# Patient Record
Sex: MALE | Race: WHITE | Employment: FULL TIME | URBAN - METROPOLITAN AREA
[De-identification: names, ages, dates, MRNs, and addresses within clinical notes are randomized per-mention and may not be internally consistent; named-entity substitution may affect disease eponyms.]

---

## 2020-06-09 ENCOUNTER — LAB ENCOUNTER (OUTPATIENT)
Dept: LAB | Facility: HOSPITAL | Age: 25
End: 2020-06-09
Attending: STUDENT IN AN ORGANIZED HEALTH CARE EDUCATION/TRAINING PROGRAM
Payer: COMMERCIAL

## 2020-06-09 ENCOUNTER — HOSPITAL ENCOUNTER (OUTPATIENT)
Dept: CV DIAGNOSTICS | Facility: HOSPITAL | Age: 25
Discharge: HOME | End: 2020-06-09
Attending: STUDENT IN AN ORGANIZED HEALTH CARE EDUCATION/TRAINING PROGRAM
Payer: COMMERCIAL

## 2020-06-09 DIAGNOSIS — Z82.49 FAMILY HISTORY OF CARDIOMYOPATHY: Primary | ICD-10-CM

## 2020-06-09 DIAGNOSIS — Z82.49 FAMILY HISTORY OF ISCHEMIC HEART DISEASE: ICD-10-CM

## 2020-06-09 PROCEDURE — 93010 ELECTROCARDIOGRAM REPORT: CPT | Performed by: STUDENT IN AN ORGANIZED HEALTH CARE EDUCATION/TRAINING PROGRAM

## 2020-06-09 PROCEDURE — 93306 TTE W/DOPPLER COMPLETE: CPT | Performed by: STUDENT IN AN ORGANIZED HEALTH CARE EDUCATION/TRAINING PROGRAM

## 2020-06-09 PROCEDURE — 93005 ELECTROCARDIOGRAM TRACING: CPT

## 2024-02-20 ENCOUNTER — LAB ENCOUNTER (OUTPATIENT)
Dept: LAB | Facility: HOSPITAL | Age: 29
End: 2024-02-20
Attending: FAMILY MEDICINE
Payer: COMMERCIAL

## 2024-02-20 ENCOUNTER — OFFICE VISIT (OUTPATIENT)
Dept: INTERNAL MEDICINE CLINIC | Facility: CLINIC | Age: 29
End: 2024-02-20
Payer: COMMERCIAL

## 2024-02-20 VITALS
OXYGEN SATURATION: 97 % | WEIGHT: 217 LBS | SYSTOLIC BLOOD PRESSURE: 120 MMHG | HEIGHT: 72 IN | DIASTOLIC BLOOD PRESSURE: 80 MMHG | BODY MASS INDEX: 29.39 KG/M2 | HEART RATE: 82 BPM

## 2024-02-20 DIAGNOSIS — Z00.00 HEALTH MAINTENANCE EXAMINATION: Primary | ICD-10-CM

## 2024-02-20 DIAGNOSIS — L21.9 SEBORRHEIC DERMATITIS: ICD-10-CM

## 2024-02-20 DIAGNOSIS — J30.2 SEASONAL ALLERGIES: ICD-10-CM

## 2024-02-20 DIAGNOSIS — E66.3 OVERWEIGHT (BMI 25.0-29.9): ICD-10-CM

## 2024-02-20 DIAGNOSIS — Z00.00 HEALTH MAINTENANCE EXAMINATION: ICD-10-CM

## 2024-02-20 PROBLEM — I10 ESSENTIAL HYPERTENSION: Status: ACTIVE | Noted: 2020-05-26

## 2024-02-20 PROBLEM — I10 ESSENTIAL HYPERTENSION: Status: RESOLVED | Noted: 2020-05-26 | Resolved: 2024-02-20

## 2024-02-20 PROBLEM — Z15.89 GENE MUTATION: Status: ACTIVE | Noted: 2024-02-20

## 2024-02-20 LAB
ALBUMIN SERPL-MCNC: 4.7 G/DL (ref 3.2–4.8)
ALBUMIN/GLOB SERPL: 1.6 {RATIO} (ref 1–2)
ALP LIVER SERPL-CCNC: 83 U/L
ALT SERPL-CCNC: 24 U/L
ANION GAP SERPL CALC-SCNC: 8 MMOL/L (ref 0–18)
AST SERPL-CCNC: 21 U/L (ref ?–34)
BASOPHILS # BLD AUTO: 0.05 X10(3) UL (ref 0–0.2)
BASOPHILS NFR BLD AUTO: 0.7 %
BILIRUB SERPL-MCNC: 0.6 MG/DL (ref 0.3–1.2)
BUN BLD-MCNC: 20 MG/DL (ref 9–23)
BUN/CREAT SERPL: 18.2 (ref 10–20)
CALCIUM BLD-MCNC: 9.7 MG/DL (ref 8.7–10.4)
CHLORIDE SERPL-SCNC: 105 MMOL/L (ref 98–112)
CHOLEST SERPL-MCNC: 220 MG/DL (ref ?–200)
CO2 SERPL-SCNC: 27 MMOL/L (ref 21–32)
CREAT BLD-MCNC: 1.1 MG/DL
DEPRECATED RDW RBC AUTO: 36.6 FL (ref 35.1–46.3)
EGFRCR SERPLBLD CKD-EPI 2021: 92 ML/MIN/1.73M2 (ref 60–?)
EOSINOPHIL # BLD AUTO: 0.26 X10(3) UL (ref 0–0.7)
EOSINOPHIL NFR BLD AUTO: 3.6 %
ERYTHROCYTE [DISTWIDTH] IN BLOOD BY AUTOMATED COUNT: 11.4 % (ref 11–15)
EST. AVERAGE GLUCOSE BLD GHB EST-MCNC: 114 MG/DL (ref 68–126)
FASTING PATIENT LIPID ANSWER: NO
FASTING STATUS PATIENT QL REPORTED: NO
GLOBULIN PLAS-MCNC: 3 G/DL (ref 2.8–4.4)
GLUCOSE BLD-MCNC: 91 MG/DL (ref 70–99)
HBA1C MFR BLD: 5.6 % (ref ?–5.7)
HCT VFR BLD AUTO: 43.5 %
HCV AB SERPL QL IA: NONREACTIVE
HDLC SERPL-MCNC: 55 MG/DL (ref 40–59)
HGB BLD-MCNC: 15.7 G/DL
IMM GRANULOCYTES # BLD AUTO: 0.02 X10(3) UL (ref 0–1)
IMM GRANULOCYTES NFR BLD: 0.3 %
LDLC SERPL CALC-MCNC: 143 MG/DL (ref ?–100)
LYMPHOCYTES # BLD AUTO: 2.33 X10(3) UL (ref 1–4)
LYMPHOCYTES NFR BLD AUTO: 32.3 %
MCH RBC QN AUTO: 31.5 PG (ref 26–34)
MCHC RBC AUTO-ENTMCNC: 36.1 G/DL (ref 31–37)
MCV RBC AUTO: 87.3 FL
MONOCYTES # BLD AUTO: 0.3 X10(3) UL (ref 0.1–1)
MONOCYTES NFR BLD AUTO: 4.2 %
NEUTROPHILS # BLD AUTO: 4.26 X10 (3) UL (ref 1.5–7.7)
NEUTROPHILS # BLD AUTO: 4.26 X10(3) UL (ref 1.5–7.7)
NEUTROPHILS NFR BLD AUTO: 58.9 %
NONHDLC SERPL-MCNC: 165 MG/DL (ref ?–130)
OSMOLALITY SERPL CALC.SUM OF ELEC: 292 MOSM/KG (ref 275–295)
PLATELET # BLD AUTO: 292 10(3)UL (ref 150–450)
POTASSIUM SERPL-SCNC: 4 MMOL/L (ref 3.5–5.1)
PROT SERPL-MCNC: 7.7 G/DL (ref 5.7–8.2)
RBC # BLD AUTO: 4.98 X10(6)UL
SODIUM SERPL-SCNC: 140 MMOL/L (ref 136–145)
TRIGL SERPL-MCNC: 122 MG/DL (ref 30–149)
TSI SER-ACNC: 2.42 MIU/ML (ref 0.55–4.78)
VLDLC SERPL CALC-MCNC: 23 MG/DL (ref 0–30)
WBC # BLD AUTO: 7.2 X10(3) UL (ref 4–11)

## 2024-02-20 PROCEDURE — 99385 PREV VISIT NEW AGE 18-39: CPT | Performed by: FAMILY MEDICINE

## 2024-02-20 PROCEDURE — 3074F SYST BP LT 130 MM HG: CPT | Performed by: FAMILY MEDICINE

## 2024-02-20 PROCEDURE — 86803 HEPATITIS C AB TEST: CPT | Performed by: FAMILY MEDICINE

## 2024-02-20 PROCEDURE — 83036 HEMOGLOBIN GLYCOSYLATED A1C: CPT | Performed by: FAMILY MEDICINE

## 2024-02-20 PROCEDURE — 3079F DIAST BP 80-89 MM HG: CPT | Performed by: FAMILY MEDICINE

## 2024-02-20 PROCEDURE — 3008F BODY MASS INDEX DOCD: CPT | Performed by: FAMILY MEDICINE

## 2024-02-20 PROCEDURE — 80050 GENERAL HEALTH PANEL: CPT | Performed by: FAMILY MEDICINE

## 2024-02-20 PROCEDURE — 87389 HIV-1 AG W/HIV-1&-2 AB AG IA: CPT | Performed by: FAMILY MEDICINE

## 2024-02-20 PROCEDURE — 80061 LIPID PANEL: CPT | Performed by: FAMILY MEDICINE

## 2024-02-20 RX ORDER — KETOCONAZOLE 20 MG/G
CREAM TOPICAL
COMMUNITY
Start: 2022-07-19

## 2024-02-20 NOTE — PATIENT INSTRUCTIONS
PATIENT INSTRUCTIONS    Thank you for seeing me today, it was a pleasure taking care of you.  Please check out at the  and schedule a follow up appointment.  Return in about 1 year (around 2/20/2025) for physical .  Please remember that the radha period for all appointments is 5 minutes. This is to help maximize the amount of time that we can spend together at our visits.    Please get your labs drawn - you may need to schedule a lab appointment if this was not completed at your recent doctor's visit.  The following imaging studies were ordered: None  Please also follow up with the following specialists: Dermatology   Please fill out the advance directive information (power of  documents) and bring a copy to our clinic.  Focus on a healthy diet and exercise  Will monitor blood pressures  Consider COVID vaccine  Can continue ketoconazole cream as needed        Best,   Dr. Douglas

## 2024-02-20 NOTE — ASSESSMENT & PLAN NOTE
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  Will monitor for cardiac symptoms

## 2024-02-20 NOTE — PROGRESS NOTES
FAMILY MEDICINE CLINIC NOTE    HPI  Ortega Ford is a 29 year old male presenting for physical    #Health Maintenance  -Diet: Good. Well rounded diet.   -Exercise: Started going to March 2023. Small group weight training.   -Lung cancer screen: Not indicated  -Colon cancer screen: Not indicated  -Prostate cancer screen: Not indicated  -Aortic aneurysm screen: Not indicated  -Statin:  Will check lipid panel  -ASA: Not indicated  -HIV screen: Indicated  -Hep C screen: Indicated  -Gonorrhea/chlamydia:  Not indicated  -Syphillis: Not indicated  -TB: Not indicated  -Tobacco/alcohol: Per below  -Depression: PHQ-2 score of 0 (score >/= 3 do PHQ-9)  -Advanced Directive: Indicated    #Immunizations  -Tdap:  Reports received  - 9/2023  -Flu shot: Indicated  -PCV13: Not indicated   -PCV20: Not indicated   -PPSV23: Not indicated   -HPV: Not indicated  -RZV (preferred) or VZL: Not indicated   -RSV: Not indicated   -COVID: Indicated    #Seasonal allergies  -using over the counter allergy medication as needed    #seborrheic dermatitis  -ketconazole cream  -seeing dermatology Muriel LOPEZ    #Gene mutation  -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   -seen by cardiology at [facility] in the past  -no CP, no SOB, no palpitations    #Patient Care Team  No care team member to display    ROS  GENERAL: No fever/chills, no recent weight loss   HEENT: No visual changes, no changes in hearing, no sore throats  NECK: No pain, no swelling  RESP: No cough, no SOB  CV: No chest pain, no palpitations  GI: No abd pain, no N/V/D  MSK: No edema, no pain  SKIN: No new rashes  NEURO: No numbness, no tingling, no HA    HEALTH MAINTENANCE CHECKLIST  Health Maintenance Topics with due status: Overdue       Topic Date Due    Annual Physical Never done    DTaP,Tdap,and Td Vaccines Never done    COVID-19 Vaccine Never done    Influenza Vaccine Never done    Annual Depression Screening Never done       ALLERGIES  No  Known Allergies    MEDICATIONS  Current Outpatient Medications   Medication Sig Dispense Refill    ketoconazole 2 % External Cream          ACTIVE PROBLEM  Patient Active Problem List   Diagnosis    Seborrheic dermatitis    Seasonal allergies    Health maintenance examination    Gene mutation    Overweight (BMI 25.0-29.9)       PAST MEDICAL HISTORY  Past Medical History:   Diagnosis Date    Gene mutation     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    Seborrheic dermatitis 02/20/2024       PAST SOCIAL HISTORY  Social History     Socioeconomic History    Marital status: Single     Spouse name: Not on file    Number of children: Not on file    Years of education: Not on file    Highest education level: Not on file   Occupational History    Not on file   Tobacco Use    Smoking status: Never    Smokeless tobacco: Never   Vaping Use    Vaping Use: Never used   Substance and Sexual Activity    Alcohol use: Yes     Comment: Occasional - 2-3 times a week    Drug use: Never    Sexual activity: Yes     Partners: Female   Other Topics Concern    Not on file   Social History Narrative    Relationships:  - [place]    Children: None    Pets: 2 Dogs    School: N/A    Work:  - high voltage power lines.     Origin: From [facility].     Interests: Plays hockey, renovating house, started golf. Watching sports - [employer]     Spiritual: Ep[employer] background, not practicing     Social Determinants of Health     Financial Resource Strain: Not on file   Food Insecurity: Not on file   Transportation Needs: Not on file   Physical Activity: Not on file   Stress: Not on file   Social Connections: Not on file   Housing Stability: Not on file       PAST SURGICAL HISTORY  History reviewed. No pertinent surgical history.    PAST FAMILY HISTORY  Family History   Problem Relation Age of Onset    No Known Problems Mother     Hyperlipidemia Father     Hypertension Father     Heart Disorder Father          Heart failure    Arrhythmia Father         A-fib, pacemaker    Arthritis Father     Diabetes Father     Bipolar Disorder Father     Cancer Sister         Non-hodgkins lymphoma    Heart Disease Sister         Dilated cardiomyopathy - doxorubicin toxicity and TTN mutation    Other (Brain aneurysm) Maternal Grandmother     Cancer Maternal Grandfather         Jaw    No Known Problems Paternal Grandmother     No Known Problems Paternal Grandfather     Other (Lupus) Paternal Uncle     No Known Problems Paternal Uncle         Early sudden death    Colon Cancer Neg     Prostate Cancer Neg        PHYSICAL EXAM  Vitals:    02/20/24 1509   BP: 120/80   Pulse: 82   SpO2: 97%   Weight: 217 lb (98.4 kg)   Height: 6' (1.829 m)      Body mass index is 29.43 kg/m².    GENERAL: NAD  HEENT: Moist mucous membranes, no tonsillar swelling or erythema, PERRLA bilat, TM translucent and non-bulging  NECK: Supple, non-tender  RESP: CTAB, no wheezing, no rales, no ronchi  CV: RRR, no murmurs  GI: Soft, non-distended, non-tender, no guarding, no rebound, no masses  MSK: No edema  SKIN: Warm and dry, no rashes  NEURO: Answering questions appropriately    LABS  No results found for: \"WBC\", \"HGB\", \"HCT\", \"PLT\", \"NEPERCENT\", \"LYPERCENT\", \"MOPERCENT\", \"EOPERCENT\", \"BAPERCENT\", \"NE\", \"LYMABS\", \"MOABSO\", \"EOABSO\", \"BAABSO\", \"REITCPERCENT\"    No results found for: \"NA\", \"K\", \"CL\", \"CO2\", \"ANIONGAP\", \"BUN\", \"CREATSERUM\", \"BUNCREA\", \"GLU\", \"CA\", \"OSMOCALC\", \"GFRNAA\", \"GFRAA\", \"ALT\", \"AST\", \"ALKPHO\", \"BILT\", \"TP\", \"ALB\", \"GLOBULIN\", \"ELECTAG\", \"FASTING\"      No results found for: \"CHOLEST\", \"TRIG\", \"HDL\", \"LDL\", \"VLDL\", \"TCHDLRATIO\", \"NONHDLC\", \"CHOLHDLRATIO\", \"CALCNONHDL\"     DIAGNOSTICS    ASSESSMENT/PLAN  Problem List Items Addressed This Visit          Endocrine and Metabolic    Overweight (BMI 25.0-29.9)     Check labs.  Focus on a healthy diet and exercise.         Relevant Orders    Comp Metabolic Panel (14)    Hemoglobin A1C    Lipid Panel    TSH  W Reflex To Free T4       EarNoseThroat    Seasonal allergies     Uses over the counter allergy medication as needed            Skin    Seborrheic dermatitis     Follows with dermatology.  Uses ketoconazole cream as needed         Relevant Medications    ketoconazole 2 % External Cream       Health Encounters    Health maintenance examination - Primary     Exercise and diet advised.  CBC, CMP, lipid panel, Hba1c, TSH, HIV screen, hepatitis C screen  Tdap - reports completed 9/2023  Flu shot today.  Advanced directive information provided.  Advised COVID vaccine.  Blood pressures controlled at this time -not currently on medications.   Will monitor closely          Relevant Orders    CBC With Differential With Platelet    Comp Metabolic Panel (14)    HCV Antibody    Hemoglobin A1C    HIV Ag/Ab Combo    Lipid Panel    TSH W Reflex To Free T4    FLULAVAL INFLUENZA VACCINE QUAD PRESERVATIVE FREE 0.5 ML       Return in about 1 year (around 2/20/2025) for physical .    Rodolfo Douglas MD  Family Medicine

## 2024-02-21 PROBLEM — E78.00 PURE HYPERCHOLESTEROLEMIA: Status: ACTIVE | Noted: 2024-02-21

## 2024-02-21 PROCEDURE — 90686 IIV4 VACC NO PRSV 0.5 ML IM: CPT | Performed by: FAMILY MEDICINE

## 2024-02-21 PROCEDURE — 3079F DIAST BP 80-89 MM HG: CPT | Performed by: FAMILY MEDICINE

## 2024-02-21 PROCEDURE — 3008F BODY MASS INDEX DOCD: CPT | Performed by: FAMILY MEDICINE

## 2024-02-21 PROCEDURE — 90471 IMMUNIZATION ADMIN: CPT | Performed by: FAMILY MEDICINE

## 2024-02-21 PROCEDURE — 3074F SYST BP LT 130 MM HG: CPT | Performed by: FAMILY MEDICINE

## 2025-03-06 ENCOUNTER — OFFICE VISIT (OUTPATIENT)
Dept: INTERNAL MEDICINE CLINIC | Facility: CLINIC | Age: 30
End: 2025-03-06
Payer: COMMERCIAL

## 2025-03-06 VITALS
OXYGEN SATURATION: 99 % | TEMPERATURE: 98 F | SYSTOLIC BLOOD PRESSURE: 122 MMHG | BODY MASS INDEX: 29.66 KG/M2 | DIASTOLIC BLOOD PRESSURE: 80 MMHG | HEART RATE: 75 BPM | WEIGHT: 219 LBS | HEIGHT: 72 IN

## 2025-03-06 DIAGNOSIS — L21.9 SEBORRHEIC DERMATITIS: ICD-10-CM

## 2025-03-06 DIAGNOSIS — J30.2 SEASONAL ALLERGIES: ICD-10-CM

## 2025-03-06 DIAGNOSIS — S05.02XD ABRASION OF LEFT CORNEA, SUBSEQUENT ENCOUNTER: ICD-10-CM

## 2025-03-06 DIAGNOSIS — Z15.89 GENE MUTATION: ICD-10-CM

## 2025-03-06 DIAGNOSIS — E78.00 PURE HYPERCHOLESTEROLEMIA: ICD-10-CM

## 2025-03-06 DIAGNOSIS — E66.3 OVERWEIGHT (BMI 25.0-29.9): ICD-10-CM

## 2025-03-06 DIAGNOSIS — Z00.00 HEALTH MAINTENANCE EXAMINATION: Primary | ICD-10-CM

## 2025-03-06 PROBLEM — S05.02XA ABRASION OF LEFT CORNEA: Status: ACTIVE | Noted: 2025-03-06

## 2025-03-06 PROCEDURE — 3008F BODY MASS INDEX DOCD: CPT | Performed by: FAMILY MEDICINE

## 2025-03-06 PROCEDURE — 99395 PREV VISIT EST AGE 18-39: CPT | Performed by: FAMILY MEDICINE

## 2025-03-06 PROCEDURE — 3074F SYST BP LT 130 MM HG: CPT | Performed by: FAMILY MEDICINE

## 2025-03-06 PROCEDURE — 3079F DIAST BP 80-89 MM HG: CPT | Performed by: FAMILY MEDICINE

## 2025-03-06 NOTE — ASSESSMENT & PLAN NOTE
Follows with dermatology.  Uses ketoconazole cream as needed  Overall well controlled at this time.

## 2025-03-06 NOTE — PROGRESS NOTES
FAMILY MEDICINE CLINIC NOTE    HPI  Ortega Ford is a 30 year old male presenting for physical    #Health Maintenance  -Diet: Good. Well rounded diet.   -Exercise: Going to the gym 5 times a week. Small group weight training.   -Lung cancer screen: Not indicated  -Colon cancer screen: Not indicated  -Prostate cancer screen: Not indicated  -Aortic aneurysm screen: Not indicated  -Statin:  2/2024 lipid panel  -ASA: Not indicated  -HIV screen: 2/2024 negative  -Hep C screen: 2/2024 negative   -Gonorrhea/chlamydia:  Not indicated  -Syphillis: Not indicated  -TB: Not indicated  -Tobacco/alcohol: Per below  -Depression: PHQ-2 score of 0 (score >/= 3 do PHQ-9)  -Advanced Directive: Indicated     #Immunizations  -Tdap:  Reports received  - 9/2023  -Flu shot: Indicated  -PCV13: Not indicated   -PCV20: Not indicated   -PPSV23: Not indicated   -HPV: Not indicated  -RZV (preferred) or VZL: Not indicated   -RSV: Not indicated   -COVID: Indicated     #Seasonal allergies  -using over the counter allergy medication as needed     #Seborrheic dermatitis  -ketconazole cream - not needing currently  -seeing new dermatology Shirley Zazueta PA-C at [facility]     #Gene mutation  -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   -sister with dilated cardiomyopathy - doxorubcin toxicity and TTN mutation  -seen by cardiology Dr Mahogany Eller at [facility] in 2020  -no CP, no SOB, no palpitations    #Corneal abrasion  -dog scratched eye in July 2024  -saw ophthalmologist Dr Arvizu - has plans to follow up in March     #HLD  -lifestyle modifications discussed    #Patient Care Team  Patient Care Team:  Rodolfo Douglas MD as PCP - General (Family Medicine)  Rhonda Sanz (OPHTHALMOLOGY)  Mahogany Eller (Internal Medicine)    TAMARA  GENERAL: No fever/chills, no recent weight loss   HEENT: No visual changes, no changes in hearing, no sore throats  NECK: No pain, no swelling  RESP: No cough, no  The ABCs of the Annual Wellness Visit  [place] to Medicare Visit    Subjective     Snow Anderson is a 72 y.o. female who presents for a  Welcome to Medicare Visit.    The following portions of the patient's history were reviewed and   updated as appropriate: allergies, current medications, past family history, past medical history, past social history, past surgical history, and problem list.     Compared to one year ago, the patient feels her physical   health is the same.    Compared to one year ago, the patient feels her mental   health is the same.    Recent Hospitalizations:  She was not admitted to the hospital during the last year.       Current Medical Providers:  Patient Care Team:  Lilo Hernandez MD as PCP - General (Family Medicine)    Outpatient Medications Prior to Visit   Medication Sig Dispense Refill    albuterol sulfate  (90 Base) MCG/ACT inhaler Every 6 (Six) Hours As Needed.      Azelastine HCl 137 MCG/SPRAY solution As Needed.      Biotin 1 MG capsule Daily.      Cholecalciferol (VITAMIN D) 1000 UNITS tablet Take 2 tablets by mouth.      EPINEPHrine (EPIPEN JR) 0.15 MG/0.3ML solution auto-injector injection As Needed.      estradiol (ESTRACE) 0.1 MG/GM vaginal cream Insert 1 applicator into the vagina.      fexofenadine (ALLEGRA) 180 MG tablet Take 1 tablet by mouth Daily.      hydrocortisone 2.5 % cream As Needed.      ipratropium (ATROVENT) 0.06 % nasal spray USE 2 SPRAY(S) IN EACH NOSTRIL 3 TO 4 TIMES DAILY      ipratropium-albuterol (DUO-NEB) 0.5-2.5 mg/3 ml nebulizer As Needed.      Multiple Vitamin (MULTI VITAMIN DAILY PO) Take  by mouth.      omeprazole (priLOSEC) 20 MG capsule Take 1 capsule by mouth Daily. 90 capsule 3    potassium chloride (K-DUR,KLOR-CON) 20 MEQ CR tablet TAKE 1  BY MOUTH ONCE DAILY 90 tablet 0    rosuvastatin (Crestor) 5 MG tablet Take 1 tablet by mouth Every Night. 90 tablet 2    Trelegy Ellipta 100-62.5-25 MCG/ACT inhaler       Triamcinolone Acetonide  "(NASACORT) 55 MCG/ACT nasal inhaler 2 sprays into the nostril(s) as directed by provider Daily.      aspirin 81 MG EC tablet Take 1 tablet by mouth Daily. Indications: Buildup of Plaques in Large Arteries Leading to the Brain      chlorthalidone (HYGROTON) 25 MG tablet Take 1 tablet by mouth once daily (Patient not taking: Reported on 1/30/2024) 90 tablet 0     No facility-administered medications prior to visit.       No opioid medication identified on active medication list. I have reviewed chart for other potential  high risk medication/s and harmful drug interactions in the elderly.        Aspirin is not on active medication list.  Aspirin use is indicated based on review of current medical condition/s. Pros and cons of this therapy have been discussed with this patient. Benefits of this medication outweigh potential harm.  Patient has been instructed to start taking this medication..    Patient Active Problem List   Diagnosis    Atopic rhinitis    Carotid atherosclerosis    Hypertension    Impaired fasting glucose    Hypovitaminosis D    Medicare annual wellness visit, subsequent    Diverticulosis of large intestine without hemorrhage    Multiple thyroid nodules    Other hyperlipidemia    Anxiety    Nuclear cataract    Allergies    Non-cardiac chest pain    Chronic cough    Heartburn    Owens's esophagus without dysplasia    Capsular cataract of right eye    Environmental allergies    Moderate persistent asthma without complication     Advance Care Planning   Advance Care Planning     Advance Directive is on file.  ACP discussion was held with the patient during this visit. Patient has an advance directive in EMR which is still valid.        Objective   Vitals:    01/30/24 0912   BP: 148/70   Pulse: 61   Temp: 97.6 °F (36.4 °C)   SpO2: 99%   Weight: 61.7 kg (136 lb)   Height: 162.6 cm (64\")   PainSc: 0-No pain     Estimated body mass index is 23.34 kg/m² as calculated from the following:    Height as of this " SOB  CV: No chest pain, no palpitations  GI: No abd pain, no N/V/D  MSK: No edema, no pain  SKIN: No new rashes  NEURO: No numbness, no tingling, no HA    HEALTH MAINTENANCE CHECKLIST  Health Maintenance Topics with due status: Overdue       Topic Date Due    DTaP,Tdap,and Td Vaccines Never done    COVID-19 Vaccine Never done    Influenza Vaccine 10/01/2024    Annual Depression Screening 01/01/2025    Annual Physical 02/20/2025       ALLERGIES  Allergies[1]    MEDICATIONS  Current Outpatient Medications   Medication Sig Dispense Refill    ketoconazole 2 % External Cream          ACTIVE PROBLEM  Patient Active Problem List   Diagnosis    Seborrheic dermatitis    Seasonal allergies    Health maintenance examination    Gene mutation    Overweight (BMI 25.0-29.9)    Pure hypercholesterolemia    Abrasion of left cornea       PAST MEDICAL HISTORY  Past Medical History:    Gene mutation    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    Pure hypercholesterolemia    Seborrheic dermatitis       PAST SOCIAL HISTORY  Social History     Socioeconomic History    Marital status:      Spouse name: Not on file    Number of children: Not on file    Years of education: Not on file    Highest education level: Not on file   Occupational History    Not on file   Tobacco Use    Smoking status: Never    Smokeless tobacco: Never   Vaping Use    Vaping status: Never Used   Substance and Sexual Activity    Alcohol use: Yes     Comment: Occasional - 2-3 times a week    Drug use: Never    Sexual activity: Yes     Partners: Female   Other Topics Concern    Not on file   Social History Narrative    Relationships:  - [place]    Children: None    Pets: 2 Dogs    School: N/A    Work:  - high voltage power lines.     Origin: From [facility].     Interests: Plays hockey, renovating house, started golf. Watching sports - [employer]     Spiritual: Judaism background, not practicing     Social Drivers of  "encounter: 162.6 cm (64\").    Weight as of this encounter: 61.7 kg (136 lb).    BMI is within normal parameters. No other follow-up for BMI required.      Does the patient have evidence of cognitive impairment?   No    Lab Results   Component Value Date    CHLPL 138 2024    TRIG 39 2024    HDL 89 (H) 2024    LDL 39 2024    VLDL 10 2024    HGBA1C 5.90 (H) 2024       Procedures       HEALTH RISK ASSESSMENT    Smoking Status:  Social History     Tobacco Use   Smoking Status Never    Passive exposure: Never   Smokeless Tobacco Never     Alcohol Consumption:  Social History     Substance and Sexual Activity   Alcohol Use Yes    Alcohol/week: 1.0 standard drink of alcohol    Types: 1 Glasses of wine per week    Comment: rare       Fall Risk Screen:    NOELLE Fall Risk Assessment was completed, and patient is at LOW risk for falls.Assessment completed on:2024    Depression Screen:       2024     9:16 AM   PHQ-2/PHQ-9 Depression Screening   Little Interest or Pleasure in Doing Things 0-->not at all   Feeling Down, Depressed or Hopeless 0-->not at all   PHQ-9: Brief Depression Severity Measure Score 0       Health Habits and Functional and Cognitive Screenin/30/2024     9:17 AM   Functional & Cognitive Status   Do you have difficulty preparing food and eating? No   Do you have difficulty bathing yourself, getting dressed or grooming yourself? No   Do you have difficulty using the toilet? No   Do you have difficulty moving around from place to place? No   Do you have trouble with steps or getting out of a bed or a chair? No   Current Diet Well Balanced Diet   Dental Exam Up to date   Eye Exam Up to date   Exercise (times per week) 4 times per week   Current Exercises Include Walking   Do you need help using the phone?  No   Are you deaf or do you have serious difficulty hearing?  No   Do you need help to go to places out of walking distance? No   Do you need help " Health     Food Insecurity: Not on file   Transportation Needs: Not on file   Stress: Not on file   Housing Stability: Not on file       PAST SURGICAL HISTORY  No past surgical history on file.    PAST FAMILY HISTORY  Family History   Problem Relation Age of Onset    No Known Problems Mother     Hyperlipidemia Father     Hypertension Father     Heart Disorder Father         Heart failure    Arrhythmia Father         A-fib, pacemaker    Arthritis Father     Diabetes Father     Bipolar Disorder Father     Cancer Sister         Non-hodgkins lymphoma    Heart Disease Sister         Dilated cardiomyopathy - doxorubicin toxicity and TTN mutation    Other (Brain aneurysm) Maternal Grandmother     Cancer Maternal Grandfather         Jaw    No Known Problems Paternal Grandmother     No Known Problems Paternal Grandfather     Other (Lupus) Paternal Uncle     No Known Problems Paternal Uncle         Early sudden death    Colon Cancer Neg     Prostate Cancer Neg        PHYSICAL EXAM  Vitals:    03/06/25 1101   BP: 122/80   Pulse: 75   Temp: 97.6 °F (36.4 °C)   SpO2: 99%   Weight: 219 lb (99.3 kg)   Height: 6' (1.829 m)      Body mass index is 29.7 kg/m².    GENERAL: NAD  HEENT: Moist mucous membranes, no tonsillar swelling or erythema, PERRLA bilat, TM translucent and non-bulging  NECK: Supple, non-tender  RESP: CTAB, no wheezing, no rales, no rhonchi  CV: RRR, no murmurs  GI: Soft, non-distended, non-tender, no guarding, no rebound, no masses  MSK: No edema  SKIN: Warm and dry, no rashes  NEURO: Answering questions appropriately    LABS  Lab Results   Component Value Date    WBC 7.2 02/20/2024    HGB 15.7 02/20/2024    HCT 43.5 02/20/2024    .0 02/20/2024    NEPERCENT 58.9 02/20/2024    LYPERCENT 32.3 02/20/2024    MOPERCENT 4.2 02/20/2024    EOPERCENT 3.6 02/20/2024    BAPERCENT 0.7 02/20/2024    NE 4.26 02/20/2024    LYMABS 2.33 02/20/2024    MOABSO 0.30 02/20/2024    EOABSO 0.26 02/20/2024    BAABSO 0.05 02/20/2024        Lab Results   Component Value Date     02/20/2024    K 4.0 02/20/2024     02/20/2024    CO2 27.0 02/20/2024    ANIONGAP 8 02/20/2024    BUN 20 02/20/2024    CREATSERUM 1.10 02/20/2024    BUNCREA 18.2 02/20/2024    GLU 91 02/20/2024    CA 9.7 02/20/2024    OSMOCALC 292 02/20/2024    ALT 24 02/20/2024    AST 21 02/20/2024    ALKPHO 83 02/20/2024    BILT 0.6 02/20/2024    TP 7.7 02/20/2024    ALB 4.7 02/20/2024    GLOBULIN 3.0 02/20/2024    ELECTAG 1.6 02/20/2024    FASTING No 02/20/2024    FASTING No 02/20/2024         Lab Results   Component Value Date    CHOLEST 220 (H) 02/20/2024    TRIG 122 02/20/2024    HDL 55 02/20/2024     (H) 02/20/2024    VLDL 23 02/20/2024    NONHDLC 165 (H) 02/20/2024        DIAGNOSTICS    ASSESSMENT/PLAN  Problem List Items Addressed This Visit          Cardiac and Vasculature    Pure hypercholesterolemia     Healthy diet and exercise advised.            Endocrine and Metabolic    Overweight (BMI 25.0-29.9)     Focus on a healthy diet and exercise.            EarNoseThroat    Seasonal allergies     Uses over the counter allergy medication as needed            Eye    Abrasion of left cornea     Following with ophthalmologist             Skin    Seborrheic dermatitis     Follows with dermatology.  Uses ketoconazole cream as needed  Overall well controlled at this time.             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  Will monitor for cardiac symptoms             Health Encounters    Health maintenance examination - Primary     Exercise and diet advised.  No labs needed today.  Will plan to check labs again next year  Tdap - reports completed 9/2023  Flu shot today.  Advised COVID vaccine.  Advanced directive information provided.         Relevant Orders    INFLUENZA VACCINE, TRI, PRESERV FREE, 0.5 ML       Return in about 1 year (around  shopping? No   Do you need help preparing meals?  No   Do you need help with housework?  No   Do you need help with laundry? No   Do you need help taking your medications? No   Do you need help managing money? No   Do you ever drive or ride in a car without wearing a seat belt? No   Have you felt unusual stress, anger or loneliness in the last month? No   Who do you live with? Spouse   If you need help, do you have trouble finding someone available to you? No   Have you been bothered in the last four weeks by sexual problems? No   Do you have difficulty concentrating, remembering or making decisions? No       Visual Acuity:    No results found.    Age-appropriate Screening Schedule:  Refer to the list below for future screening recommendations based on patient's age, sex and/or medical conditions. Orders for these recommended tests are listed in the plan section. The patient has been provided with a written plan.    Health Maintenance   Topic Date Due    LIPID PANEL  01/24/2025    ANNUAL WELLNESS VISIT  01/30/2025    COLORECTAL CANCER SCREENING  06/08/2025    DXA SCAN  09/06/2025    GASTROSCOPY (EGD)  09/09/2025    MAMMOGRAM  09/14/2025    TDAP/TD VACCINES (3 - Td or Tdap) 04/25/2030    HEPATITIS C SCREENING  Completed    COVID-19 Vaccine  Completed    INFLUENZA VACCINE  Completed    Pneumococcal Vaccine 65+  Completed    ZOSTER VACCINE  Completed        CMS Preventative Services Quick Reference  Risk Factors Identified During Encounter    Dental Screening Recommended  Vision Screening Recommended  The above risks/problems have been discussed with the patient.  Pertinent information has been shared with the patient in the After Visit Summary.    Follow Up:   Initial Medicare Visit in one year    An After Visit Summary and PPPS were made available to the patient.      Additional E&M Note during same encounter follows:  Patient has multiple medical problems which are significant and separately identifiable that  "require additional work above and beyond the Medicare Wellness Visit.      Chief Complaint  Medicare Wellness-subsequent (No complains doing well  not taking chlorthalidone  last 4 day because feel bp making her tired  took some bp report 115/73  feel better since  quit ,sleeping   is not best   either )    Subjective        HPI  Snow Anderson is also being seen today for hypertension which is not well-controlled, adverse effects of excessive fatigue with chlorthalidone 25 mg daily.  At home her bp was 105/62, and then after that her BP was 115/72, and then a day she was really active - her BP was 125/73.  She has not been taking her blood pressure meds over the last week.  Goal blood pressure less than 130/90.  She has spells of feeling very low energy and really has to work to get up and work.     Insomnia: not well controlled that has been off since the summer,  She can fall asleep, She wakes up every hour to 2 hours.  She comes out of a deep sleep, she then knows the time, she does go to the bathroom.  She has decreased fluid at night which has not helped.  Her mind is usually not busy. She can go back to bed and falls asleep quickly.  She is getting up 6-8 times. Not gasping for air, she does not have any morning hypersomnia.        Objective   Vital Signs:  /70   Pulse 61   Temp 97.6 °F (36.4 °C)   Ht 162.6 cm (64\")   Wt 61.7 kg (136 lb)   SpO2 99%   BMI 23.34 kg/m²     Physical Exam  Vitals and nursing note reviewed.   Constitutional:       General: She is not in acute distress.     Appearance: She is well-developed.   HENT:      Head: Normocephalic.      Nose: Nose normal.   Eyes:      General: No scleral icterus.  Pulmonary:      Effort: Pulmonary effort is normal. No respiratory distress.   Musculoskeletal:         General: Normal range of motion.   Skin:     General: Skin is warm and dry.      Findings: No rash.   Neurological:      Mental Status: She is alert and oriented to person, place, " 3/6/2026) for physical.    Rodolfo Douglas MD  Family Medicine         [1] No Known Allergies     and time.   Psychiatric:         Behavior: Behavior normal.         Thought Content: Thought content normal.         Judgment: Judgment normal.          The following data was reviewed by: Lilo Hernandez MD on 01/30/2024:       Vitamin D,25-Hydroxy (01/24/2024 09:15)  Hemoglobin A1c (01/24/2024 09:15)  Lipid Panel (01/24/2024 09:15)  CBC & Differential (01/24/2024 09:15)  Comprehensive Metabolic Panel (01/24/2024 09:15)     Assessment and Plan   Diagnoses and all orders for this visit:    1. Medicare annual wellness visit, subsequent (Primary)    2. Primary insomnia  -     doxepin (SINEquan) 10 MG capsule; Take 1 capsule by mouth Every Night.  Dispense: 30 capsule; Refill: 1    3. Primary hypertension    Medicare wellness visit today, overall up-to-date, due for colonoscopy and EGD next year, she will follow-up with GI in about 1 year.    Insomnia, not well-controlled, new problem since August.  Mostly nighttime wakening, no issues with sleeping, her symptoms do not sound like CARLOS although she does have positive family history for this.  If she gets adequate hours of sleep she does feel that it is restorative.  I do agree that there could be a component of bladder overactivity.    Plan to start Silenor as above, she may take half of the capsule daily follow-up in 3 months.    Hypertension elevated today but her home log shows normal blood pressures and borderline low numbers, she will continue to check them at home 3 times a week and bring a log with her at the next appointment if her home numbers go above 130/90 she will start her chlorthalidone again.  Although we could consider other options with the frequent urination.         Follow Up   Return in about 3 months (around 4/30/2024), or if symptoms worsen or fail to improve, for Recheck HTN and insomnia .  Patient was given instructions and counseling regarding her condition or for health maintenance advice. Please see specific information pulled into the AVS if  appropriate.

## 2025-03-06 NOTE — PATIENT INSTRUCTIONS
PATIENT INSTRUCTIONS    Thank you for seeing me today, it was a pleasure taking care of you.  Please check out at the  and schedule a follow up appointment.  Return in about 1 year (around 3/6/2026) for physical.  Please remember that the preferred radha period for appointments is 5 minutes. This is to help maximize the amount of time that we can spend together at our visits.    The following imaging studies were ordered: None  Please also follow up with the following specialists: Dermatology, ophthalmology   Please fill out the advance directive information (power of  documents) and bring a copy to our clinic.  Focus on weight loss  Portion control  Mediterranean diet     Dr. Misael Phillips

## 2025-03-06 NOTE — ASSESSMENT & PLAN NOTE
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  Will monitor for cardiac symptoms

## 2025-03-06 NOTE — ASSESSMENT & PLAN NOTE
Exercise and diet advised.  No labs needed today.  Will plan to check labs again next year  Tdap - reports completed 9/2023  Flu shot today.  Advised COVID vaccine.  Advanced directive information provided.